# Patient Record
Sex: MALE | Race: WHITE | NOT HISPANIC OR LATINO | URBAN - METROPOLITAN AREA
[De-identification: names, ages, dates, MRNs, and addresses within clinical notes are randomized per-mention and may not be internally consistent; named-entity substitution may affect disease eponyms.]

---

## 2019-05-03 ENCOUNTER — OFFICE VISIT (OUTPATIENT)
Dept: URBAN - METROPOLITAN AREA CLINIC 71 | Facility: CLINIC | Age: 79
End: 2019-05-03
Payer: MEDICARE

## 2019-05-03 DIAGNOSIS — H33.321 ROUND HOLE OF RETINA OF RIGHT EYE: ICD-10-CM

## 2019-05-03 PROCEDURE — 92014 COMPRE OPH EXAM EST PT 1/>: CPT | Performed by: OPTOMETRIST

## 2019-05-03 ASSESSMENT — INTRAOCULAR PRESSURE
OD: 16
OS: 16

## 2019-05-03 NOTE — IMPRESSION/PLAN
Impression: Diagnosis: Age-related nuclear cataract, bilateral. Code: H25.13. Pre-DM. Plan: Cataracts account for some of the patient's complaints. New glasses/contacts not expected to make significant improvement. Discussed options: surgery vs spectacle change. Patient declines surgery at this time.   Ok to schedule a cataract evaluation with a cataract surgeon at patient's request.

## 2019-10-04 ENCOUNTER — OFFICE VISIT (OUTPATIENT)
Dept: URBAN - METROPOLITAN AREA CLINIC 71 | Facility: CLINIC | Age: 79
End: 2019-10-04
Payer: MEDICARE

## 2019-10-04 DIAGNOSIS — H25.13 AGE-RELATED NUCLEAR CATARACT, BILATERAL: ICD-10-CM

## 2019-10-04 DIAGNOSIS — H53.2 DIPLOPIA: Primary | ICD-10-CM

## 2019-10-04 PROCEDURE — 92012 INTRM OPH EXAM EST PATIENT: CPT | Performed by: OPTOMETRIST

## 2019-10-04 ASSESSMENT — INTRAOCULAR PRESSURE
OS: 15
OD: 15

## 2019-10-04 NOTE — IMPRESSION/PLAN
Impression: Diplopia: H53.2. binocular. Only <0.5 left hyperphoria today but pt reports having had [id number] diplopia. Suspect TIA. Ocular health unremarkable OU. Pt reports controlled HTN and BGL.  Plan: recommend cerebrovascular work up by PCP/cardiologist to rule out risk of Stroke

## 2019-10-04 NOTE — IMPRESSION/PLAN
Impression: Diagnosis: Age-related nuclear cataract, bilateral. Code: H25.13. Pre-DM.  Plan: observe

## 2019-11-26 ENCOUNTER — OFFICE VISIT (OUTPATIENT)
Dept: URBAN - METROPOLITAN AREA CLINIC 71 | Facility: CLINIC | Age: 79
End: 2019-11-26
Payer: COMMERCIAL

## 2019-11-26 PROCEDURE — 92012 INTRM OPH EXAM EST PATIENT: CPT | Performed by: OPTOMETRIST

## 2019-11-26 ASSESSMENT — INTRAOCULAR PRESSURE
OD: 11
OS: 10

## 2019-11-26 ASSESSMENT — VISUAL ACUITY
OS: 20/20
OD: 20/20

## 2019-11-26 NOTE — IMPRESSION/PLAN
Impression: Diagnosis: Age-related nuclear cataract, bilateral. Code: H25.13. Pre-DM. BCVA 20/20 OD/OS. BAT not 2 lines worse. Plan: Cataracts affecting vision some, but no surgery is currently recommended. The patient will monitor vision changes and contact us with any decrease in vision. Continue to monitor.

## 2021-06-23 ENCOUNTER — OFFICE VISIT (OUTPATIENT)
Dept: URBAN - METROPOLITAN AREA CLINIC 72 | Facility: CLINIC | Age: 81
End: 2021-06-23
Payer: MEDICARE

## 2021-06-23 DIAGNOSIS — H25.813 COMBINED FORMS OF AGE-RELATED CATARACT, BILATERAL: ICD-10-CM

## 2021-06-23 DIAGNOSIS — E11.9 TYPE 2 DIABETES MELLITUS W/O COMPLICATION: Primary | ICD-10-CM

## 2021-06-23 DIAGNOSIS — H43.813 VITREOUS DEGENERATION, BILATERAL: ICD-10-CM

## 2021-06-23 PROCEDURE — 99213 OFFICE O/P EST LOW 20 MIN: CPT | Performed by: OPTOMETRIST

## 2021-06-23 ASSESSMENT — INTRAOCULAR PRESSURE
OS: 14
OD: 13

## 2021-06-23 NOTE — IMPRESSION/PLAN
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 appears as though Kehinde Carr has been there for some time. Plan: Discussed diagnosis in detail with patient. No treatment is required at this time. Reassured patient of current condition and treatment. Will continue to observe condition and or symptoms.

## 2021-06-23 NOTE — IMPRESSION/PLAN
Impression: Vitreous degeneration, bilateral: H43.813. Plan: The PVD is stable, and there is no evidence of a retinal tear or detachment on dilated exam.  I again reviewed the signs and symptoms of a retinal tear and detachment in detail with the patient, including worsening flashes, new floaters, and development of a shadow/curtain in the peripheral visual field. The patient was advised to call immediately with any changes to [address] or increase in symptoms.

## 2021-06-23 NOTE — IMPRESSION/PLAN
Impression: Type 2 diabetes mellitus w/o complication: U42.3. Plan: Diabetes type II: no background retinopathy, no signs of neovascularization noted. Discussed ocular and systemic benefits of blood sugar control.

## 2021-06-24 ENCOUNTER — OFFICE VISIT (OUTPATIENT)
Dept: URBAN - METROPOLITAN AREA CLINIC 72 | Facility: CLINIC | Age: 81
End: 2021-06-24
Payer: COMMERCIAL

## 2021-06-24 DIAGNOSIS — H52.4 PRESBYOPIA: Primary | ICD-10-CM

## 2021-06-24 PROCEDURE — 92012 INTRM OPH EXAM EST PATIENT: CPT | Performed by: OPTOMETRIST

## 2021-06-24 ASSESSMENT — VISUAL ACUITY
OS: 20/20
OD: 20/20

## 2022-06-10 ENCOUNTER — OFFICE VISIT (OUTPATIENT)
Dept: URBAN - METROPOLITAN AREA CLINIC 71 | Facility: CLINIC | Age: 82
End: 2022-06-10
Payer: MEDICARE

## 2022-06-10 DIAGNOSIS — H25.813 COMBINED FORMS OF AGE-RELATED CATARACT, BILATERAL: ICD-10-CM

## 2022-06-10 DIAGNOSIS — H33.321 ROUND HOLE OF RETINA OF RIGHT EYE: ICD-10-CM

## 2022-06-10 DIAGNOSIS — H43.813 VITREOUS DEGENERATION, BILATERAL: Primary | ICD-10-CM

## 2022-06-10 DIAGNOSIS — H35.371 PUCKERING OF MACULA, RIGHT EYE: ICD-10-CM

## 2022-06-10 PROCEDURE — 99214 OFFICE O/P EST MOD 30 MIN: CPT | Performed by: OPTOMETRIST

## 2022-06-10 PROCEDURE — 92134 CPTRZ OPH DX IMG PST SGM RTA: CPT | Performed by: OPTOMETRIST

## 2022-06-10 ASSESSMENT — INTRAOCULAR PRESSURE
OS: 14
OD: 10

## 2022-06-10 NOTE — IMPRESSION/PLAN
Impression: Combined forms of age-related cataract, bilateral: H25.813. Pt notices decreased vision OD>OS, but it does not bother him yet. Plan: Cataracts account for some of the patient's complaints. New glasses/contacts not expected to make significant improvement. Discussed options: surgery vs spectacle change. Patient declines surgery at this time.   Ok to schedule a cataract evaluation with OCT-[facility] at patient's request.

## 2022-06-10 NOTE — IMPRESSION/PLAN
Impression: Vitreous degeneration, bilateral: H43.813. stable OU Plan: Continue to monitor with DE yearly.

## 2022-06-10 NOTE — IMPRESSION/PLAN
Impression: Puckering of macula, right eye: H35.371. OCT 06/10/22: confirms mild ERM OD. Plan: Continue to observe with DE/OCT yearly.

## 2023-04-20 ENCOUNTER — OFFICE VISIT (OUTPATIENT)
Dept: URBAN - METROPOLITAN AREA CLINIC 71 | Facility: CLINIC | Age: 83
End: 2023-04-20
Payer: MEDICARE

## 2023-04-20 DIAGNOSIS — H35.371 PUCKERING OF MACULA, RIGHT EYE: Primary | ICD-10-CM

## 2023-04-20 DIAGNOSIS — H43.813 VITREOUS DEGENERATION, BILATERAL: ICD-10-CM

## 2023-04-20 DIAGNOSIS — H25.813 COMBINED FORMS OF AGE-RELATED CATARACT, BILATERAL: ICD-10-CM

## 2023-04-20 DIAGNOSIS — H33.321 ROUND HOLE OF RETINA OF RIGHT EYE: ICD-10-CM

## 2023-04-20 PROCEDURE — 92134 CPTRZ OPH DX IMG PST SGM RTA: CPT | Performed by: OPTOMETRIST

## 2023-04-20 PROCEDURE — 99214 OFFICE O/P EST MOD 30 MIN: CPT | Performed by: OPTOMETRIST

## 2023-04-20 ASSESSMENT — INTRAOCULAR PRESSURE
OD: 16
OS: 15

## 2023-04-20 NOTE — IMPRESSION/PLAN
Impression: Puckering of macula, right eye: H35.371. OCT 04/20/23: ERM stable OD (improved) Plan: Discussed with pt. Continue to monitor with DE/OCT yearly. Pt to call with any changes in vision.

## 2023-04-20 NOTE — IMPRESSION/PLAN
Impression: Combined forms of age-related cataract, bilateral: H25.813. Pt notices mild decreased vision OD>OS, but it does not bother him yet. Plan: Cataracts account for some of the patient's complaints. New glasses/contacts not expected to make significant improvement. Discussed options: surgery vs spectacle change. Patient declines surgery at this time.   Ok to schedule a cataract evaluation with OCT-mac at patient's request.

## 2025-01-27 ENCOUNTER — OFFICE VISIT (OUTPATIENT)
Dept: URBAN - METROPOLITAN AREA CLINIC 71 | Facility: CLINIC | Age: 85
End: 2025-01-27
Payer: MEDICARE

## 2025-01-27 DIAGNOSIS — H43.813 VITREOUS DEGENERATION, BILATERAL: ICD-10-CM

## 2025-01-27 DIAGNOSIS — H25.813 COMBINED FORMS OF AGE-RELATED CATARACT, BILATERAL: ICD-10-CM

## 2025-01-27 DIAGNOSIS — H35.371 PUCKERING OF MACULA, RIGHT EYE: Primary | ICD-10-CM

## 2025-01-27 PROCEDURE — 99213 OFFICE O/P EST LOW 20 MIN: CPT

## 2025-01-27 ASSESSMENT — INTRAOCULAR PRESSURE
OS: 14
OD: 12